# Patient Record
Sex: MALE | Employment: OTHER | URBAN - METROPOLITAN AREA
[De-identification: names, ages, dates, MRNs, and addresses within clinical notes are randomized per-mention and may not be internally consistent; named-entity substitution may affect disease eponyms.]

---

## 2017-11-14 PROBLEM — H26.9 RIGHT CATARACT: Status: ACTIVE | Noted: 2017-11-14

## 2023-01-27 ENCOUNTER — APPOINTMENT (OUTPATIENT)
Dept: CT IMAGING | Age: 88
End: 2023-01-27
Payer: MEDICARE

## 2023-01-27 ENCOUNTER — HOSPITAL ENCOUNTER (EMERGENCY)
Age: 88
Discharge: HOME | End: 2023-01-27
Payer: MEDICARE

## 2023-01-27 VITALS
RESPIRATION RATE: 18 BRPM | WEIGHT: 175 LBS | SYSTOLIC BLOOD PRESSURE: 154 MMHG | BODY MASS INDEX: 26.52 KG/M2 | DIASTOLIC BLOOD PRESSURE: 76 MMHG | OXYGEN SATURATION: 96 % | HEIGHT: 68 IN | HEART RATE: 90 BPM | TEMPERATURE: 99.2 F

## 2023-01-27 DIAGNOSIS — R11.2 NAUSEA AND VOMITING, UNSPECIFIED VOMITING TYPE: Primary | ICD-10-CM

## 2023-01-27 LAB
ALBUMIN SERPL-MCNC: 4.5 G/DL (ref 3.5–5.2)
ALP BLD-CCNC: 58 U/L (ref 40–129)
ALT SERPL-CCNC: 21 U/L (ref 0–40)
ANION GAP SERPL CALCULATED.3IONS-SCNC: 9 MMOL/L (ref 7–16)
AST SERPL-CCNC: 23 U/L (ref 0–39)
BACTERIA: ABNORMAL /HPF
BASOPHILS ABSOLUTE: 0.02 E9/L (ref 0–0.2)
BASOPHILS RELATIVE PERCENT: 0.2 % (ref 0–2)
BILIRUB SERPL-MCNC: 0.5 MG/DL (ref 0–1.2)
BILIRUBIN URINE: NEGATIVE
BLOOD, URINE: ABNORMAL
BUN BLDV-MCNC: 22 MG/DL (ref 6–23)
CALCIUM SERPL-MCNC: 10.1 MG/DL (ref 8.6–10.2)
CHLORIDE BLD-SCNC: 105 MMOL/L (ref 98–107)
CLARITY: CLEAR
CO2: 28 MMOL/L (ref 22–29)
COLOR: YELLOW
CREAT SERPL-MCNC: 0.9 MG/DL (ref 0.7–1.2)
EOSINOPHILS ABSOLUTE: 0.04 E9/L (ref 0.05–0.5)
EOSINOPHILS RELATIVE PERCENT: 0.4 % (ref 0–6)
EPITHELIAL CELLS, UA: ABNORMAL /HPF
GFR SERPL CREATININE-BSD FRML MDRD: >60 ML/MIN/1.73
GLUCOSE BLD-MCNC: 113 MG/DL (ref 74–99)
GLUCOSE URINE: NEGATIVE MG/DL
HCT VFR BLD CALC: 49.6 % (ref 37–54)
HEMOGLOBIN: 16.3 G/DL (ref 12.5–16.5)
IMMATURE GRANULOCYTES #: 0.03 E9/L
IMMATURE GRANULOCYTES %: 0.3 % (ref 0–5)
KETONES, URINE: NEGATIVE MG/DL
LACTIC ACID: 1 MMOL/L (ref 0.5–2.2)
LEUKOCYTE ESTERASE, URINE: NEGATIVE
LIPASE: 19 U/L (ref 13–60)
LYMPHOCYTES ABSOLUTE: 1.95 E9/L (ref 1.5–4)
LYMPHOCYTES RELATIVE PERCENT: 18 % (ref 20–42)
MCH RBC QN AUTO: 30.9 PG (ref 26–35)
MCHC RBC AUTO-ENTMCNC: 32.9 % (ref 32–34.5)
MCV RBC AUTO: 93.9 FL (ref 80–99.9)
MONOCYTES ABSOLUTE: 0.51 E9/L (ref 0.1–0.95)
MONOCYTES RELATIVE PERCENT: 4.7 % (ref 2–12)
NEUTROPHILS ABSOLUTE: 8.31 E9/L (ref 1.8–7.3)
NEUTROPHILS RELATIVE PERCENT: 76.4 % (ref 43–80)
NITRITE, URINE: NEGATIVE
PDW BLD-RTO: 13.2 FL (ref 11.5–15)
PH UA: 5 (ref 5–9)
PLATELET # BLD: 214 E9/L (ref 130–450)
PMV BLD AUTO: 9.9 FL (ref 7–12)
POTASSIUM SERPL-SCNC: 4.2 MMOL/L (ref 3.5–5)
PROTEIN UA: 100 MG/DL
RBC # BLD: 5.28 E12/L (ref 3.8–5.8)
RBC UA: ABNORMAL /HPF (ref 0–2)
SODIUM BLD-SCNC: 142 MMOL/L (ref 132–146)
SPECIFIC GRAVITY UA: >=1.03 (ref 1–1.03)
TOTAL PROTEIN: 7.1 G/DL (ref 6.4–8.3)
UROBILINOGEN, URINE: 0.2 E.U./DL
WBC # BLD: 10.9 E9/L (ref 4.5–11.5)
WBC UA: ABNORMAL /HPF (ref 0–5)

## 2023-01-27 PROCEDURE — 80053 COMPREHEN METABOLIC PANEL: CPT

## 2023-01-27 PROCEDURE — 99285 EMERGENCY DEPT VISIT HI MDM: CPT

## 2023-01-27 PROCEDURE — 85025 COMPLETE CBC W/AUTO DIFF WBC: CPT

## 2023-01-27 PROCEDURE — 6360000004 HC RX CONTRAST MEDICATION: Performed by: RADIOLOGY

## 2023-01-27 PROCEDURE — 81001 URINALYSIS AUTO W/SCOPE: CPT

## 2023-01-27 PROCEDURE — 74177 CT ABD & PELVIS W/CONTRAST: CPT

## 2023-01-27 PROCEDURE — 83605 ASSAY OF LACTIC ACID: CPT

## 2023-01-27 PROCEDURE — 2580000003 HC RX 258: Performed by: PHYSICIAN ASSISTANT

## 2023-01-27 PROCEDURE — 83690 ASSAY OF LIPASE: CPT

## 2023-01-27 PROCEDURE — 6360000002 HC RX W HCPCS: Performed by: PHYSICIAN ASSISTANT

## 2023-01-27 PROCEDURE — 96374 THER/PROPH/DIAG INJ IV PUSH: CPT

## 2023-01-27 RX ORDER — ONDANSETRON 4 MG/1
4 TABLET, FILM COATED ORAL EVERY 8 HOURS PRN
Qty: 20 TABLET | Refills: 0 | Status: SHIPPED | OUTPATIENT
Start: 2023-01-27

## 2023-01-27 RX ORDER — 0.9 % SODIUM CHLORIDE 0.9 %
1000 INTRAVENOUS SOLUTION INTRAVENOUS ONCE
Status: COMPLETED | OUTPATIENT
Start: 2023-01-27 | End: 2023-01-27

## 2023-01-27 RX ORDER — ONDANSETRON 2 MG/ML
4 INJECTION INTRAMUSCULAR; INTRAVENOUS ONCE
Status: COMPLETED | OUTPATIENT
Start: 2023-01-27 | End: 2023-01-27

## 2023-01-27 RX ADMIN — IOPAMIDOL 75 ML: 755 INJECTION, SOLUTION INTRAVENOUS at 20:19

## 2023-01-27 RX ADMIN — ONDANSETRON 4 MG: 2 INJECTION INTRAMUSCULAR; INTRAVENOUS at 19:37

## 2023-01-27 RX ADMIN — SODIUM CHLORIDE 1000 ML: 9 INJECTION, SOLUTION INTRAVENOUS at 19:38

## 2023-01-27 ASSESSMENT — LIFESTYLE VARIABLES
HOW OFTEN DO YOU HAVE A DRINK CONTAINING ALCOHOL: NEVER
HOW MANY STANDARD DRINKS CONTAINING ALCOHOL DO YOU HAVE ON A TYPICAL DAY: PATIENT DOES NOT DRINK

## 2023-01-27 NOTE — Clinical Note
Rosalina Landa was seen and treated in our emergency department on 1/27/2023. He may return to work on 01/28/2023. If you have any questions or concerns, please don't hesitate to call.       Jarrett Crooks PA-C

## 2023-01-28 NOTE — ED PROVIDER NOTES
[facility] Visit. Department of Emergency Medicine   ED  Encounter Note  Admit Date/RoomTime: 2023  6:55 PM  ED Room:     NAME: Tesha Contreras  : 1935  MRN: [id number]     Chief Complaint:  Emesis (Pt used baking soda deordorizer accin cookie recipe)    History of Present Illness        Tesha Contreras is a 80 y.o. old male who presents to the emergency department by private vehicle, with gradual onset of nausea and vomiting of dry heaves, undigested food, or clear fluids which began today prior to arrival.  There has been no similar episodes in the past.  Patient is concerned that the symptoms may be due to using a baking soda deodorizer in a cookie recipe that he recently made. Patient states that he made approximately 30 cookies 2 days ago. Has eaten all 30 cookies in the last 2 days. . The symptoms are associated with no additional symptoms. Since inset the symptoms have been remaining constant. His symptoms are aggravated by eating and liquids and relieved by nothing. There has been no abdominal pain, back pain, chest pain, shortness of breath, fever, chills, sweating, anorexia, weight loss, diarrhea, constipation, dark/black stools, blood in stool, blood in emesis, cloudy urine, urinary frequency, dysuria, hematuria, urinary urgency, urinary incontinence, penile discharge, or scrotal pain. ROS   Pertinent positives and negatives are stated within HPI, all other systems reviewed and are negative. Past Medical History:  has a past medical history of Acid reflux, Cancer ([address] Hypertension, and Prostate enlargement. Surgical History:  has a past surgical history that includes skin biopsy; Appendectomy; Tonsillectomy; Abdomen surgery; joint replacement; Knee arthroscopy; Cataract removal with implant (Left, 04/07/15); and Cataract removal with implant (Right, 2017). Social History:  reports that he has never smoked.  He has never used smokeless tobacco. He reports current alcohol use. Family History: family history is not on file. Allergies: Sulfa antibiotics    Physical Exam   Oxygen Saturation Interpretation: Normal on room air analysis. ED Triage Vitals   BP Temp Temp Source Heart Rate Resp SpO2 Height Weight   01/27/23 1856 01/27/23 1846 01/27/23 1846 01/27/23 1856 01/27/23 1856 01/27/23 1856 01/27/23 1856 01/27/23 1856   (!) 180/96 99.2 °F (37.3 °C) Oral 90 18 96 % 5' 8\" (1.727 m) 175 lb (79.4 kg)       Constitutional:  Alert, development consistent with age. HEENT:  NC/NT. Airway patent. Neck:  Normal ROM. Supple. Respiratory:  Clear to auscultation and breath sounds equal.  CV:  Regular rate and rhythm, normal heart sounds, without pathological murmurs, ectopy, gallops, or rubs. GI:  normal appearing, non-distended with no visible hernias. Bowel sounds: normal bowel sounds. Tenderness: No abdominal tenderness, guarding, rebound, rigidity or pulsatile mass. .        Liver: non-tender. Spleen:  non-tender. /Rectal: Rectal Examination deferred, N/A or declined by patient  Back: CVA Tenderness: No CVA tenderness. Integument:  Normal turgor. Warm, dry, without visible rash, unless noted elsewhere. Lymphatics: No lymphangitis or adenopathy noted. Neurological:  Oriented. Motor functions intact.     Lab / Imaging Results   (All laboratory and radiology results have been personally reviewed by myself)  Labs:  Results for orders placed or performed during the hospital encounter of 01/27/23   Comprehensive Metabolic Panel   Result Value Ref Range    Sodium 142 132 - 146 mmol/L    Potassium 4.2 3.5 - 5.0 mmol/L    Chloride 105 98 - 107 mmol/L    CO2 28 22 - 29 mmol/L    Anion Gap 9 7 - 16 mmol/L    Glucose 113 (H) 74 - 99 mg/dL    BUN 22 6 - 23 mg/dL    Creatinine 0.9 0.7 - 1.2 mg/dL    Est, Glom Filt Rate >60 >=60 mL/min/1.73    Calcium 10.1 8.6 - 10.2 mg/dL    Total Protein 7.1 6.4 - 8.3 g/dL    Albumin 4.5 3.5 - 5.2 g/dL    Total Bilirubin 0.5 0.0 - 1.2 mg/dL    Alkaline Phosphatase 58 40 - 129 U/L    ALT 21 0 - 40 U/L    AST 23 0 - 39 U/L   CBC with Auto Differential   Result Value Ref Range    WBC 10.9 4.5 - 11.5 E9/L    RBC 5.28 3.80 - 5.80 E12/L    Hemoglobin 16.3 12.5 - 16.5 g/dL    Hematocrit 49.6 37.0 - 54.0 %    MCV 93.9 80.0 - 99.9 fL    MCH 30.9 26.0 - 35.0 pg    MCHC 32.9 32.0 - 34.5 %    RDW 13.2 11.5 - 15.0 fL    Platelets 034 323 - 514 E9/L    MPV 9.9 7.0 - 12.0 fL    Neutrophils % 76.4 43.0 - 80.0 %    Immature Granulocytes % 0.3 0.0 - 5.0 %    Lymphocytes % 18.0 (L) 20.0 - 42.0 %    Monocytes % 4.7 2.0 - 12.0 %    Eosinophils % 0.4 0.0 - 6.0 %    Basophils % 0.2 0.0 - 2.0 %    Neutrophils Absolute 8.31 (H) 1.80 - 7.30 E9/L    Immature Granulocytes # 0.03 E9/L    Lymphocytes Absolute 1.95 1.50 - 4.00 E9/L    Monocytes Absolute 0.51 0.10 - 0.95 E9/L    Eosinophils Absolute 0.04 (L) 0.05 - 0.50 E9/L    Basophils Absolute 0.02 0.00 - 0.20 E9/L   Lipase   Result Value Ref Range    Lipase 19 13 - 60 U/L   Urinalysis with Microscopic   Result Value Ref Range    Color, UA Yellow Straw/Yellow    Clarity, UA Clear Clear    Glucose, Ur Negative Negative mg/dL    Bilirubin Urine Negative Negative    Ketones, Urine Negative Negative mg/dL    Specific Gravity, UA >=1.030 1.005 - 1.030    Blood, Urine SMALL (A) Negative    pH, UA 5.0 5.0 - 9.0    Protein,  (A) Negative mg/dL    Urobilinogen, Urine 0.2 <2.0 E.U./dL    Nitrite, Urine Negative Negative    Leukocyte Esterase, Urine Negative Negative    WBC, UA NONE 0 - 5 /HPF    RBC, UA 1-3 0 - 2 /HPF    Epithelial Cells, UA FEW /HPF    Bacteria, UA NONE SEEN None Seen /HPF   Lactic Acid   Result Value Ref Range    Lactic Acid 1.0 0.5 - 2.2 mmol/L     Imaging: All Radiology results interpreted by Radiologist unless otherwise noted.   CT ABDOMEN PELVIS W IV CONTRAST Additional Contrast? None   Final Result   Gallbladder distension with no gallbladder wall thickening, stones, sludge or pericholecystic fluid. Grossly normal common bile duct. Correlate with   right upper quadrant pain specifically. Diffuse colonic fecal retention. ED Course / Medical Decision Making     Medications   0.9 % sodium chloride bolus (1,000 mLs IntraVENous New Bag 1/27/23 1938)   ondansetron (ZOFRAN) injection 4 mg (4 mg IntraVENous Given 1/27/23 1937)   iopamidol (ISOVUE-370) 76 % injection 75 mL (75 mLs IntraVENous Given 1/27/23 2019)      Re-examination:  1/27/23       Time: 2100  Patients condition is improving. Patient moved from the internal waiting room to bed #26. He gave the patient a cup of water and he finished the entire thing while I was discussing his results with him. Repeat examination shows no abdominal tenderness whatsoever. Specifically he has no right upper quadrant tenderness. Negative Sylvester sign. Consultations:             None    Procedure(s):   None    MDM:   Patient presents to the emergency department for nausea and vomiting. Concern for using a baking soda deodorizer in her recent cookie recipe, however after reviewing the box it only contains sodium bicarbonate which would be the same active ingredient in regular baking soda. Did discuss this with my attending physician and the clinical pharmacist.  Patient more likely experiencing nausea and vomiting from eating 30 cookies. Based on the patient's symptoms and his age, a thorough diagnostic work-up to include labs and CT abdomen pelvis was obtained. Labs are obtained and are normal.  CT abdomen pelvis did show gallbladder distention without gallbladder wall thickening, stones, sludge, or pericholecystic fluid. See reexamination above, he has no abdominal tenderness and a negative Sylvester sign. In a decision-making process ultrasound right upper quadrant was not obtained. He will receive the prescription below which and follow-up with his primary care provider. Patient educated on new meds.   Strict return precautions were discussed and he should return for any new or worsening symptoms. He understands that if he develops any pain whatsoever he should come back to the emergency room immediately even if he has to call 911 to do so. Plan of Care/Counseling:  Mike Bolaños PA-C reviewed today's visit with the patient in addition to providing specific details for the plan of care and counseling regarding the diagnosis and prognosis. Questions are answered at this time and are agreeable with the plan. Assessment     1. Nausea and vomiting, unspecified vomiting type      Plan   Discharged home. Patient condition is good    New Medications     New Prescriptions    ONDANSETRON (ZOFRAN) 4 MG TABLET    Take 1 tablet by mouth every 8 hours as needed for Nausea or Vomiting     Electronically signed by Mike Bolaños PA-C   DD: 1/27/23  **This report was transcribed using voice recognition software. Every effort was made to ensure accuracy; however, inadvertent computerized transcription errors may be present.   END OF ED PROVIDER NOTE        Mike Bolaños PA-C  01/27/23 8685

## 2023-12-15 ENCOUNTER — HOSPITAL ENCOUNTER (EMERGENCY)
Age: 88
Discharge: HOME | End: 2023-12-15
Payer: MEDICARE

## 2023-12-15 ENCOUNTER — APPOINTMENT (OUTPATIENT)
Dept: GENERAL RADIOLOGY | Age: 88
End: 2023-12-15
Payer: MEDICARE

## 2023-12-15 ENCOUNTER — APPOINTMENT (OUTPATIENT)
Dept: CT IMAGING | Age: 88
End: 2023-12-15
Payer: MEDICARE

## 2023-12-15 VITALS
DIASTOLIC BLOOD PRESSURE: 84 MMHG | TEMPERATURE: 97.8 F | OXYGEN SATURATION: 98 % | SYSTOLIC BLOOD PRESSURE: 173 MMHG | RESPIRATION RATE: 16 BRPM | HEART RATE: 80 BPM

## 2023-12-15 DIAGNOSIS — S62.613A CLOSED DISPLACED FRACTURE OF PROXIMAL PHALANX OF LEFT MIDDLE FINGER, INITIAL ENCOUNTER: Primary | ICD-10-CM

## 2023-12-15 DIAGNOSIS — S61.412A LACERATION OF LEFT HAND WITHOUT FOREIGN BODY, INITIAL ENCOUNTER: ICD-10-CM
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
## 2023-12-15 DIAGNOSIS — S62.615A CLOSED DISPLACED FRACTURE OF PROXIMAL PHALANX OF LEFT RING FINGER, INITIAL ENCOUNTER: ICD-10-CM

## 2023-12-15 PROCEDURE — 90471 IMMUNIZATION ADMIN: CPT | Performed by: PHYSICIAN ASSISTANT

## 2023-12-15 PROCEDURE — 6360000002 HC RX W HCPCS: Performed by: PHYSICIAN ASSISTANT

## 2023-12-15 PROCEDURE — 90714 TD VACC NO PRESV 7 YRS+ IM: CPT | Performed by: PHYSICIAN ASSISTANT

## 2023-12-15 PROCEDURE — 72125 CT NECK SPINE W/O DYE: CPT

## 2023-12-15 PROCEDURE — 73130 X-RAY EXAM OF HAND: CPT

## 2023-12-15 PROCEDURE — 70450 CT HEAD/BRAIN W/O DYE: CPT

## 2023-12-15 RX ORDER — LIDOCAINE HYDROCHLORIDE 10 MG/ML
5 INJECTION, SOLUTION INFILTRATION; PERINEURAL ONCE
Status: DISCONTINUED | OUTPATIENT
Start: 2023-12-15 | End: 2023-12-15 | Stop reason: HOSPADM

## 2023-12-15 RX ORDER — TETANUS AND DIPHTHERIA TOXOIDS ADSORBED 2; 2 [LF]/.5ML; [LF]/.5ML
0.5 INJECTION INTRAMUSCULAR ONCE
Status: COMPLETED | OUTPATIENT
Start: 2023-12-15 | End: 2023-12-15

## 2023-12-15 RX ADMIN — TETANUS AND DIPHTHERIA TOXOIDS ADSORBED 0.5 ML: 2; 2 INJECTION INTRAMUSCULAR at 18:56

## 2023-12-29 ENCOUNTER — HOSPITAL ENCOUNTER (EMERGENCY)
Age: 88
Discharge: HOME | End: 2023-12-29
Payer: MEDICARE

## 2023-12-29 VITALS
TEMPERATURE: 98.9 F | OXYGEN SATURATION: 98 % | RESPIRATION RATE: 20 BRPM | DIASTOLIC BLOOD PRESSURE: 86 MMHG | WEIGHT: 178 LBS | BODY MASS INDEX: 27.06 KG/M2 | HEART RATE: 79 BPM | SYSTOLIC BLOOD PRESSURE: 173 MMHG

## 2023-12-29 DIAGNOSIS — Z48.02 ENCOUNTER FOR REMOVAL OF SUTURES: Primary | ICD-10-CM

## 2023-12-29 PROCEDURE — 99212 OFFICE O/P EST SF 10 MIN: CPT

## 2023-12-29 ASSESSMENT — PAIN - FUNCTIONAL ASSESSMENT: PAIN_FUNCTIONAL_ASSESSMENT: 0-10

## 2023-12-29 ASSESSMENT — PAIN SCALES - GENERAL: PAINLEVEL_OUTOF10: 0

## 2023-12-29 NOTE — ED PROVIDER NOTES
[facility]  Department of Emergency Medicine    encounter Note  Admit Date/RoomTime: 2023 12:08 PM   room:     NAME: Sánchez Gallegos  : 1935  MRN: [id number]     Chief Complaint:  Suture / Staple Removal    History of Present Illness       Sánchez Gallegos is a 88 y.o. old male who presents to the urgent care for suture removal from his forehead and his hand.  Patient was seen at [facility] and had sutures placed in his forehead and left hand on the palmar surface following a fall on 12/15/2023.  He has not had any complications with the sutures, no fevers or drainage, states that he is not having pain to the area.             Associated Symptoms:      [x]   None        []   Pain      []   Swelling      []   Redness      []   Drainage      []   Bleeding      []   Fever/Chills     ROS   Pertinent positives and negatives are stated within HPI, all other systems reviewed and are negative.    Past Medical History:  has a past medical history of Acid reflux, Cancer (HCC), Hypertension, and Prostate enlargement.    Surgical History:  has a past surgical history that includes skin biopsy; Appendectomy; Tonsillectomy; Abdomen surgery; joint replacement; Knee arthroscopy; Cataract removal with implant (Left, 04/07/15); and Cataract removal with implant (Right, 2017).    Social History:  reports that he has never smoked. He has never used smokeless tobacco. He reports current alcohol use.    Family History: family history is not on file.     Allergies: Sulfa antibiotics    Physical Exam   Oxygen Saturation Interpretation: Normal.        ED Triage Vitals   BP Temp Temp src Pulse Respirations SpO2 Height Weight - Scale   23 1211 23 1211 -- 23 1211 23 1211 23 1211 -- 23 1209   (!) 173/86 98.9 °F (37.2 °C)  79 20 98 %  80.7 kg (178 lb)         Constitutional:  Alert, development consistent with age.  HEENT:  NC/NT.  Airway patent.  Neck:  Normal ROM.   Care/Counseling:  ERIKA Maynard CNP reviewed today's visit with the patient in addition to providing specific details for the plan of care and counseling regarding the diagnosis and prognosis. Questions are answered at this time and are agreeable with the plan. Assessment     1. Encounter for removal of sutures      Plan   Discharged home. Patient condition is stable    [facility]  [address] [place] [address] [phone]    Call   As needed       New Medications     New Prescriptions    No medications on file     Electronically signed by ERIKA Maynard CNP   DD: 12/29/23  **This report was transcribed using voice recognition software. Every effort was made to ensure accuracy; however, inadvertent computerized transcription errors may be present.   END OF ED PROVIDER NOTE      ERIKA Maynard CNP  12/29/23 0059

## 2023-12-29 NOTE — DISCHARGE INSTR - COC
ACCESS:922709156}    Nursing Mobility/ADLs:  Walking   {CHP DME ADLs:228911865}  Transfer  {CHP DME ADLs:327115699}  Bathing  {CHP DME ADLs:546072517}  Dressing  {CHP DME ADLs:089913639}  Toileting  {CHP DME ADLs:947826888}  Feeding  {CHP DME ADLs:923375682}  Med Admin  {CHP DME ADLs:309719504}  Med Delivery   { JIAN MED Delivery:124668467}    Wound Care Documentation and Therapy:        Elimination:  Continence:   Bowel: {YES / NO:}  Bladder: {YES / NO:}  Urinary Catheter: {Urinary Catheter:484910067}   Colostomy/Ileostomy/Ileal Conduit: {YES / NO:}       Date of Last BM: ***  No intake or output data in the 24 hours ending 23 1248  No intake/output data recorded.    Safety Concerns:     { JIAN Safety Concerns:309068580}    Impairments/Disabilities:      { JIAN Impairments/Disabilities:803705955}    Nutrition Therapy:  Current Nutrition Therapy:   { JIAN Diet List:088052563}    Routes of Feeding: {P DME Other Feedings:959152961}  Liquids: {Slp liquid thickness:20234}  Daily Fluid Restriction: {CHP DME Yes amt example:574131540}  Last Modified Barium Swallow with Video (Video Swallowing Test): {Done Not Done Date:}    Treatments at the Time of Hospital Discharge:   Respiratory Treatments: ***  Oxygen Therapy:  {Therapy; copd oxygen:42453}  Ventilator:    { CC Vent List:830158286}    Rehab Therapies: {THERAPEUTIC INTERVENTION:6093956407}  Weight Bearing Status/Restrictions: {[facility] Weight Bearin}  Other Medical Equipment (for information only, NOT a DME order):  {EQUIPMENT:890872367}  Other Treatments: ***    Patient's personal belongings (please select all that are sent with patient):  {P DME Belongings:035935677}    RN SIGNATURE:  {Esignature:142922091}    CASE MANAGEMENT/SOCIAL WORK SECTION    Inpatient Status Date: ***    Readmission Risk Assessment Score:  Readmission Risk              Risk of Unplanned Readmission:  0           Discharging to Facility/ Agency  Name:   Address:  Phone:  Fax:    Dialysis Facility (if applicable)   Name:  Address:  Dialysis Schedule:  Phone:  Fax:    / signature: {Esignature:354668023}    PHYSICIAN SECTION    Prognosis: {Prognosis:6608413161}    Condition at Discharge: [address] Patient Condition:206776536}    Rehab Potential (if transferring to Rehab): {Prognosis:3660282372}    Recommended Labs or Other Treatments After Discharge: ***    Physician Certification: I certify the above information and transfer of Ora Aparicio  is necessary for the continuing treatment of the diagnosis listed and that he requires {Admit to Appropriate Level of Care:96181} for {GREATER/LESS:086591063} 30 days.      Update Admission H&P: {CHP DME Changes in BLSOC:000785949}    PHYSICIAN SIGNATURE:  {Esignature:126047835}

## 2024-01-03 ENCOUNTER — EVALUATION (OUTPATIENT)
Dept: OCCUPATIONAL THERAPY | Age: 89
End: 2024-01-03

## 2024-01-03 DIAGNOSIS — S62.618A: ICD-10-CM

## 2024-01-03 DIAGNOSIS — S62.618A: Primary | ICD-10-CM

## 2024-01-03 NOTE — PROGRESS NOTES
Iontophoresis:   [x] Tendon Glides                                               [] Neuromuscular Re-Ed            [] ADL/IADL re-training    [x] Therapeutic Activity       [x] Pain Management with/without modalities PRN                 [x] Manual Therapy                      [] Splinting                      [x] Scar Management                   []Joint Protection/Training  []Ergonomics                             [] Joint Mobilization        [] Adaptive Equipment Assessment/Training                             [x] Manual Edema Mobilization   [] Myofascial Release                 [] Energy Conservation/Work Simplification  [x] FM Coordination       [] Safety retraining/education per  individual diagnosis/goals  [] Desensitization       Patient Specific Goal: Use hand again                             GOALS (Long term same as Short term):  1) Patient will demonstrate good understanding of home program (exercises/activities/diagnosis/prognosis/goals) with good accuracy.   2) Patient will demonstrate increased active/passive range of motion of their left hand by atleast DPC <1 for ADL/IADL completion including all self care and using the hand for driving.  3) Patient will demonstrate  /pinch strength of at least 30 / 5 pinch pounds of their left hand for ease of opening tight containers and unilateral lifting of items during the day.   4) Patient to report decreased pain in their affected left hand from 1-6/10 light hand movement to 2/10 or less with full daily functional use.   5) Increase in fine motor function as evidenced by decreased time to complete 9-hole peg test and/or MRMT test by at least 5-10 seconds with  left hand  in order to complete fasteners more easily.   6) Patient to demonstrate decreased guarding of the left hand fractured fingers  from 100% to 20% or less.   7) Patient will decrease QuickDASH score to 25% or less for increased participation in daily functional activities.     Past

## 2024-01-05 PROBLEM — S62.618A: Status: ACTIVE | Noted: 2024-01-05

## 2024-01-10 ENCOUNTER — TREATMENT (OUTPATIENT)
Dept: OCCUPATIONAL THERAPY | Age: 89
End: 2024-01-10

## 2024-01-10 DIAGNOSIS — S62.618A: ICD-10-CM

## 2024-01-10 DIAGNOSIS — S62.618A: Primary | ICD-10-CM

## 2024-01-10 NOTE — PROGRESS NOTES
OCCUPATIONAL THERAPY DAILY NOTE  [facility] PHYSICIANS [place] SPECIALTY [facility] OCCUPATIONAL THERAPY   [address] [place] [address]  Dept: [phone]  Loc: [phone]   [facility] OT Fax: [phone]      Date:  1/10/2024  Initial Evaluation Date: 1-3-24     Evaluating Therapist: Ashley Estevez OT    Patient Name:  Sánchez Gallegos    :  1935    Restrictions/Precautions:  Gentle ROM only, NWB x 4 weeks,  Low fall risk  Diagnosis:    S62.618A Displaced fracture of the proximal phalanx of other finger , initial encounter for closed fracture                                                             Date of Surgery/Injury: LMF/ LRF proximal phalanx fractures (DOI 12/15/23)     Insurance/Certification information:  [employer] Medicare  Plan of care signed (Y/N): Y (thru 4-3-24)  Visit# / total visits: Eval+  visits ( 10 approved visits thru 4-3-24)     Referring Practitioner:  Dr Donald House  Specific Practitioner Orders: OT evaluate and treat: PROM, AAROM, AROM, strengthening,  and modalities as needed.  Pt with fractures of the proximal phalanx of the LM/ LR fingers. Pt is OK for buddy taping for gentle AROM and stretching. Continue NWB for the first 4 weeks post injury.     Assessment of current deficits   [] Functional mobility             [x] ADLs           [x] Strength                  [] Cognition   [] Functional transfers           [x] IADLs          [] Safety Awareness  [] Endurance   [x] Fine Motor Coordination    [] Balance      [] Vision/perception    [x] Sensation     [] Gross Motor Coordination [x] ROM           [x] Pain                        [x] Edema          [x] Scar Adhesion/Skin Integrity      OT PLAN OF CARE   OT POC based on physician orders, patient diagnosis and results of clinical assessment     Frequency/Duration: 2x/ week x 8 weeks     Specific OT Treatment to include:   [x] Instruction in HEP                   Modalities:  [x] Therapeutic Exercise

## 2024-01-12 ENCOUNTER — TREATMENT (OUTPATIENT)
Dept: OCCUPATIONAL THERAPY | Age: 89
End: 2024-01-12

## 2024-01-12 DIAGNOSIS — S62.618A: Primary | ICD-10-CM

## 2024-01-12 DIAGNOSIS — S62.618A: ICD-10-CM

## 2024-01-12 NOTE — PROGRESS NOTES
[x] Ultrasound               [] Electrical Stimulation/Attended  [x] PROM/Stretching                    [x] Fluidotherapy          [x]  Paraffin                   [x] AAROM  [x] AROM                 [] Iontophoresis:   [x] Tendon Glides                                               [] Neuromuscular Re-Ed            [] ADL/IADL re-training    [x] Therapeutic Activity                  [x] Pain Management with/without modalities PRN                 [x] Manual Therapy                      [] Splinting                                   [x] Scar Management                   []Joint Protection/Training  []Ergonomics                             [] Joint Mobilization                      [] Adaptive Equipment Assessment/Training                             [x] Manual Edema Mobilization   [] Myofascial Release                 [] Energy Conservation/Work Simplification  [x] FM Coordination           [] Safety retraining/education per  individual diagnosis/goals  [] Desensitization        Patient Specific Goal: Use hand again                             GOALS (Long term same as Short term):  1) Patient will demonstrate good understanding of home program (exercises/activities/diagnosis/prognosis/goals) with good accuracy.   2) Patient will demonstrate increased active/passive range of motion of their left hand by atleast DPC <1 for ADL/IADL completion including all self care and using the hand for driving.  3) Patient will demonstrate  /pinch strength of at least 30 / 5 pinch pounds of their left hand for ease of opening tight containers and unilateral lifting of items during the day.   4) Patient to report decreased pain in their affected left hand from 1-6/10 light hand movement to 2/10 or less with full daily functional use.   5) Increase in fine motor function as evidenced by decreased time to complete 9-hole peg test and/or MRMT test by at least 5-10 seconds with  left hand  in order to complete fasteners more

## 2024-01-15 ENCOUNTER — TREATMENT (OUTPATIENT)
Dept: OCCUPATIONAL THERAPY | Age: 89
End: 2024-01-15
Payer: MEDICARE

## 2024-01-15 DIAGNOSIS — S62.618A: ICD-10-CM

## 2024-01-15 DIAGNOSIS — S62.618A: Primary | ICD-10-CM

## 2024-01-15 PROCEDURE — 97530 THERAPEUTIC ACTIVITIES: CPT | Performed by: OCCUPATIONAL THERAPY ASSISTANT

## 2024-01-15 PROCEDURE — 97110 THERAPEUTIC EXERCISES: CPT | Performed by: OCCUPATIONAL THERAPY ASSISTANT

## 2024-01-15 PROCEDURE — 97140 MANUAL THERAPY 1/> REGIONS: CPT | Performed by: OCCUPATIONAL THERAPY ASSISTANT

## 2024-01-15 PROCEDURE — 97018 PARAFFIN BATH THERAPY: CPT | Performed by: OCCUPATIONAL THERAPY ASSISTANT

## 2024-01-15 NOTE — PROGRESS NOTES
OCCUPATIONAL THERAPY DAILY NOTE  [facility] PHYSICIANS [place] SPECIALTY [facility] OCCUPATIONAL THERAPY   [address] [place] [address]  Dept: [phone]  Loc: [phone]   [facility] OT Fax: [phone]      Date:  1/15/2024  Initial Evaluation Date: 1-3-24     Evaluating Therapist: FRANCISCA Otero    Patient Name:  Sánchez Gallegos    :  1935    Restrictions/Precautions:  Gentle ROM only, NWB x 4 weeks,  Low fall risk  Diagnosis:    S62.618A Displaced fracture of the proximal phalanx of other finger , initial encounter for closed fracture                                                             Date of Surgery/Injury: LMF/ LRF proximal phalanx fractures (DOI 12/15/23)     Insurance/Certification information:  [employer] Medicare  Plan of care signed (Y/N): Y (thru 4-3-24)  Visit# / total visits: Eval+ 3 /  visits ( 10 approved visits thru 4-3-24)     Referring Practitioner:  Dr Donald House  Specific Practitioner Orders: OT evaluate and treat: PROM, AAROM, AROM, strengthening,  and modalities as needed.  Pt with fractures of the proximal phalanx of the LM/ LR fingers. Pt is OK for buddy taping for gentle AROM and stretching. Continue NWB for the first 4 weeks post injury.     Assessment of current deficits   [] Functional mobility             [x] ADLs           [x] Strength                  [] Cognition   [] Functional transfers           [x] IADLs          [] Safety Awareness  [] Endurance   [x] Fine Motor Coordination    [] Balance      [] Vision/perception    [x] Sensation     [] Gross Motor Coordination [x] ROM           [x] Pain                        [x] Edema          [x] Scar Adhesion/Skin Integrity      OT PLAN OF CARE   OT POC based on physician orders, patient diagnosis and results of clinical assessment     Frequency/Duration: 2x/ week x 8 weeks     Specific OT Treatment to include:   [x] Instruction in HEP                   Modalities:  [x] Therapeutic Exercise

## 2024-01-17 ENCOUNTER — TREATMENT (OUTPATIENT)
Dept: OCCUPATIONAL THERAPY | Age: 89
End: 2024-01-17

## 2024-01-17 DIAGNOSIS — S62.618A: ICD-10-CM

## 2024-01-17 DIAGNOSIS — S62.618A: Primary | ICD-10-CM

## 2024-01-17 NOTE — PROGRESS NOTES
OCCUPATIONAL THERAPY DAILY NOTE  [facility] PHYSICIANS [place] SPECIALTY [facility] OCCUPATIONAL THERAPY   [address] [place] [address]  Dept: [phone]  Loc: [phone]   [facility] OT Fax: [phone]      Date:  2024  Initial Evaluation Date: 1-3-24     Evaluating Therapist: Ashley Estevez OT    Patient Name:  Sánchez Gallegos    :  1935    Restrictions/Precautions:  Gentle ROM only, NWB x 4 weeks,  Low fall risk  Diagnosis:    S62.618A Displaced fracture of the proximal phalanx of other finger , initial encounter for closed fracture                                                             Date of Surgery/Injury: LMF/ LRF proximal phalanx fractures (DOI 12/15/23)     Insurance/Certification information:  [employer]  Plan of care signed (Y/N): Y (thru 4-3-24)  Visit# / total visits: Eval+  /  visits ( 10 approved visits thru 4-3-24)     Referring Practitioner:  Dr Donald House  Specific Practitioner Orders: OT evaluate and treat: PROM, AAROM, AROM, strengthening,  and modalities as needed.  Pt with fractures of the proximal phalanx of the LM/ LR fingers. Pt is OK for buddy taping for gentle AROM and stretching. Continue NWB for the first 4 weeks post injury.     Assessment of current deficits   [] Functional mobility             [x] ADLs           [x] Strength                  [] Cognition   [] Functional transfers           [x] IADLs          [] Safety Awareness  [] Endurance   [x] Fine Motor Coordination    [] Balance      [] Vision/perception    [x] Sensation     [] Gross Motor Coordination [x] ROM           [x] Pain                        [x] Edema          [x] Scar Adhesion/Skin Integrity      OT PLAN OF CARE   OT POC based on physician orders, patient diagnosis and results of clinical assessment     Frequency/Duration: 2x/ week x 8 weeks     Specific OT Treatment to include:   [x] Instruction in HEP                   Modalities:  [x] Therapeutic Exercise

## 2024-01-22 ENCOUNTER — TREATMENT (OUTPATIENT)
Dept: OCCUPATIONAL THERAPY | Age: 89
End: 2024-01-22

## 2024-01-22 DIAGNOSIS — S62.618A: ICD-10-CM

## 2024-01-22 DIAGNOSIS — S62.618A: Primary | ICD-10-CM

## 2024-01-22 NOTE — PROGRESS NOTES
OCCUPATIONAL THERAPY DAILY NOTE  [facility] PHYSICIANS [place] SPECIALTY [facility] OCCUPATIONAL THERAPY   [address] [place] [address]  Dept: [phone]  Loc: [phone]   [facility] OT Fax: [phone]      Date:  2024  Initial Evaluation Date: 1-3-24     Evaluating Therapist: FRANCISCA Otero    Patient Name:  Sánchez Gallegos    :  1935    Restrictions/Precautions:  Gentle ROM only, NWB x 4 weeks,  Low fall risk  Diagnosis:    S62.618A Displaced fracture of the proximal phalanx of other finger , initial encounter for closed fracture                                                             Date of Surgery/Injury: LMF/ LRF proximal phalanx fractures (DOI 12/15/23)     Insurance/Certification information:  [employer] Medicare  Plan of care signed (Y/N): Y (thru 4-3-24)  Visit# / total visits: Eval+5 / 16 visits ( 10 approved visits thru 4-3-24)     Referring Practitioner:  Dr Donald House  Specific Practitioner Orders: OT evaluate and treat: PROM, AAROM, AROM, strengthening,  and modalities as needed.  Pt with fractures of the proximal phalanx of the LM/ LR fingers. Pt is OK for buddy taping for gentle AROM and stretching. Continue NWB for the first 4 weeks post injury.     Assessment of current deficits   [] Functional mobility             [x] ADLs           [x] Strength                  [] Cognition   [] Functional transfers           [x] IADLs          [] Safety Awareness  [] Endurance   [x] Fine Motor Coordination    [] Balance      [] Vision/perception    [x] Sensation     [] Gross Motor Coordination [x] ROM           [x] Pain                        [x] Edema          [x] Scar Adhesion/Skin Integrity      OT PLAN OF CARE   OT POC based on physician orders, patient diagnosis and results of clinical assessment     Frequency/Duration: 2x/ week x 8 weeks     Specific OT Treatment to include:   [x] Instruction in HEP                   Modalities:  [x] Therapeutic Exercise

## 2024-01-24 ENCOUNTER — TREATMENT (OUTPATIENT)
Dept: OCCUPATIONAL THERAPY | Age: 89
End: 2024-01-24
Payer: MEDICARE

## 2024-01-24 DIAGNOSIS — S62.618A: ICD-10-CM

## 2024-01-24 DIAGNOSIS — S62.618A: Primary | ICD-10-CM

## 2024-01-24 PROCEDURE — 97140 MANUAL THERAPY 1/> REGIONS: CPT | Performed by: OCCUPATIONAL THERAPY ASSISTANT

## 2024-01-24 PROCEDURE — 97530 THERAPEUTIC ACTIVITIES: CPT | Performed by: OCCUPATIONAL THERAPY ASSISTANT

## 2024-01-24 PROCEDURE — 97018 PARAFFIN BATH THERAPY: CPT | Performed by: OCCUPATIONAL THERAPY ASSISTANT

## 2024-01-24 PROCEDURE — 97110 THERAPEUTIC EXERCISES: CPT | Performed by: OCCUPATIONAL THERAPY ASSISTANT

## 2024-01-24 NOTE — PROGRESS NOTES
OCCUPATIONAL THERAPY DAILY NOTE  [facility] PHYSICIANS [place] SPECIALTY [facility] OCCUPATIONAL THERAPY   [address] [place] [address]  Dept: [phone]  Loc: [phone]   [facility] OT Fax: [phone]      Date:  2024  Initial Evaluation Date: 1-3-24     Evaluating Therapist: FRANCISCA Otero    Patient Name:  Sánchez Gallegos    :  1935    Restrictions/Precautions:  Gentle ROM only, NWB x 4 weeks,  Low fall risk  Diagnosis:    S62.618A Displaced fracture of the proximal phalanx of other finger , initial encounter for closed fracture                                                             Date of Surgery/Injury: LMF/ LRF proximal phalanx fractures (DOI 12/15/23)     Insurance/Certification information:  Human Medicare  Plan of care signed (Y/N): Y (thru 4-3-24)  Visit# / total visits: Eval+6 / 16 visits ( 10 approved visits thru 4-3-24)     Referring Practitioner:  Dr Donald House  Specific Practitioner Orders: OT evaluate and treat: PROM, AAROM, AROM, strengthening,  and modalities as needed.  Pt with fractures of the proximal phalanx of the LM/ LR fingers. Pt is OK for buddy taping for gentle AROM and stretching. Continue NWB for the first 4 weeks post injury.     Assessment of current deficits   [] Functional mobility             [x] ADLs           [x] Strength                  [] Cognition   [] Functional transfers           [x] IADLs          [] Safety Awareness  [] Endurance   [x] Fine Motor Coordination    [] Balance      [] Vision/perception    [x] Sensation     [] Gross Motor Coordination [x] ROM           [x] Pain                        [x] Edema          [x] Scar Adhesion/Skin Integrity      OT PLAN OF CARE   OT POC based on physician orders, patient diagnosis and results of clinical assessment     Frequency/Duration: 2x/ week x 8 weeks     Specific OT Treatment to include:   [x] Instruction in HEP                   Modalities:  [x] Therapeutic Exercise

## 2024-01-31 ENCOUNTER — TREATMENT (OUTPATIENT)
Dept: OCCUPATIONAL THERAPY | Age: 89
End: 2024-01-31
Payer: MEDICARE

## 2024-01-31 DIAGNOSIS — S62.618A: Primary | ICD-10-CM

## 2024-01-31 DIAGNOSIS — S62.618A: ICD-10-CM

## 2024-01-31 PROCEDURE — 97110 THERAPEUTIC EXERCISES: CPT | Performed by: OCCUPATIONAL THERAPIST

## 2024-01-31 PROCEDURE — 97530 THERAPEUTIC ACTIVITIES: CPT | Performed by: OCCUPATIONAL THERAPIST

## 2024-01-31 PROCEDURE — 97018 PARAFFIN BATH THERAPY: CPT | Performed by: OCCUPATIONAL THERAPIST

## 2024-01-31 PROCEDURE — 97140 MANUAL THERAPY 1/> REGIONS: CPT | Performed by: OCCUPATIONAL THERAPIST

## 2024-01-31 NOTE — PROGRESS NOTES
OCCUPATIONAL THERAPY DAILY NOTE  [facility] PHYSICIANS [place] SPECIALTY [facility] OCCUPATIONAL THERAPY   [address] [place] [address]  Dept: [phone]  Loc: [phone]   [facility] OT Fax: [phone]      Date:  2024  Initial Evaluation Date: 1-3-24     Evaluating Therapist: Ashley Estevez OT    Patient Name:  Sánchez Gallegos    :  1935    Restrictions/Precautions:  Gentle ROM only, NWB x 4 weeks,  Low fall risk  Diagnosis:    S62.618A Displaced fracture of the proximal phalanx of other finger , initial encounter for closed fracture                                                             Date of Surgery/Injury: LMF/ LRF proximal phalanx fractures (DOI 12/15/23)     Insurance/Certification information:  [employer] Medicare  Plan of care signed (Y/N): Y (thru 4-3-24)  Visit# / total visits: Eval+7 / 16 visits ( 10 approved visits thru 4-3-24)     Referring Practitioner:  Dr Donald House  Specific Practitioner Orders: OT evaluate and treat: PROM, AAROM, AROM, strengthening,  and modalities as needed.  Pt with fractures of the proximal phalanx of the LM/ LR fingers. Pt is OK for buddy taping for gentle AROM and stretching. Continue NWB for the first 4 weeks post injury.     Assessment of current deficits   [] Functional mobility             [x] ADLs           [x] Strength                  [] Cognition   [] Functional transfers           [x] IADLs          [] Safety Awareness  [] Endurance   [x] Fine Motor Coordination    [] Balance      [] Vision/perception    [x] Sensation     [] Gross Motor Coordination [x] ROM           [x] Pain                        [x] Edema          [x] Scar Adhesion/Skin Integrity      OT PLAN OF CARE   OT POC based on physician orders, patient diagnosis and results of clinical assessment     Frequency/Duration: 2x/ week x 8 weeks     Specific OT Treatment to include:   [x] Instruction in HEP                   Modalities:  [x] Therapeutic Exercise

## 2024-02-02 ENCOUNTER — TREATMENT (OUTPATIENT)
Dept: OCCUPATIONAL THERAPY | Age: 89
End: 2024-02-02

## 2024-02-02 DIAGNOSIS — S62.618A: Primary | ICD-10-CM

## 2024-02-02 DIAGNOSIS — S62.618A: ICD-10-CM

## 2024-02-02 NOTE — PROGRESS NOTES
[x] Ultrasound               [] Electrical Stimulation/Attended  [x] PROM/Stretching                    [x] Fluidotherapy          []  Paraffin                   [x] AAROM  [x] AROM                 [] Iontophoresis:   [x] Tendon Glides                                               [] Neuromuscular Re-Ed            [] ADL/IADL re-training    [x] Therapeutic Activity                  [x] Pain Management with/without modalities PRN                 [x] Manual Therapy                      [] Splinting                                   [x] Scar Management                   []Joint Protection/Training  []Ergonomics                             [] Joint Mobilization                      [] Adaptive Equipment Assessment/Training                             [x] Manual Edema Mobilization   [] Myofascial Release                 [] Energy Conservation/Work Simplification  [x] FM Coordination           [] Safety retraining/education per  individual diagnosis/goals  [] Desensitization        Patient Specific Goal: Use hand again                             GOALS (Long term same as Short term): updated 2-2-24  1) Patient will demonstrate good understanding of home program (exercises/activities/diagnosis/prognosis/goals) with good accuracy.( Ongoing for edema control,  ROM, stretches and light strengthening)   2) Patient will demonstrate increased active/passive range of motion of their left hand by atleast DPC <1 for ADL/IADL completion including all self care and using the hand for driving. (Progress noted- DPC has improved to -1 cm to -2.5 cm at each digit)  3) Patient will demonstrate  /pinch strength of at least 30 / 5 pinch pounds of their left hand for ease of opening tight containers and unilateral lifting of items during the day. (Goal met for prehension strength but remains below goal level for  at 20#. Tolerance for lifting and opening tight containers has improved.)  4) Patient to report decreased

## 2024-02-07 ENCOUNTER — TREATMENT (OUTPATIENT)
Dept: OCCUPATIONAL THERAPY | Age: 89
End: 2024-02-07

## 2024-02-07 DIAGNOSIS — S62.618A: Primary | ICD-10-CM

## 2024-02-07 DIAGNOSIS — S62.618A: ICD-10-CM

## 2024-02-07 NOTE — PROGRESS NOTES
OCCUPATIONAL THERAPY DAILY NOTE  [facility] PHYSICIANS [place] SPECIALTY [facility] OCCUPATIONAL THERAPY   [address] [place] [address]  Dept: [phone]  Loc: [phone]   [facility] OT Fax: [phone]      Date:  2024  Initial Evaluation Date: 1-3-24     Evaluating Therapist: Ashley Estevez OT    Patient Name:  Sánchez Gallegos    :  1935    Restrictions/Precautions:  Activity as tolerated,  Low fall risk  Diagnosis:    S62.618A Displaced fracture of the proximal phalanx of other finger , initial encounter for closed fracture                                                             Date of Surgery/Injury: LMF/ LRF proximal phalanx fractures (DOI 12/15/23)     Insurance/Certification information:  [employer]  Plan of care signed (Y/N): Y (thru 4-3-24)  Visit# / total visits: Eval+  /  visits ( 10 approved visits thru 4-3-24)     Referring Practitioner:  Dr Donald House  Specific Practitioner Orders: OT evaluate and treat: PROM, AAROM, AROM, strengthening,  and modalities as needed.  Pt with fractures of the proximal phalanx of the LM/ LR fingers. Pt is OK for buddy taping for gentle AROM and stretching. Continue NWB for the first 4 weeks post injury.     Assessment of current deficits   [] Functional mobility             [x] ADLs           [x] Strength                  [] Cognition   [] Functional transfers           [x] IADLs          [] Safety Awareness  [] Endurance   [x] Fine Motor Coordination    [] Balance      [] Vision/perception    [x] Sensation     [] Gross Motor Coordination [x] ROM           [x] Pain                        [x] Edema          [x] Scar Adhesion/Skin Integrity      OT PLAN OF CARE   OT POC based on physician orders, patient diagnosis and results of clinical assessment     Frequency/Duration: 2x/ week x 8 weeks     Specific OT Treatment to include:   [x] Instruction in HEP                   Modalities:  [x] Therapeutic Exercise                 [x]

## 2024-02-09 ENCOUNTER — TREATMENT (OUTPATIENT)
Dept: OCCUPATIONAL THERAPY | Age: 89
End: 2024-02-09

## 2024-02-09 DIAGNOSIS — S62.618A: ICD-10-CM

## 2024-02-09 DIAGNOSIS — S62.618A: Primary | ICD-10-CM

## 2024-02-09 NOTE — PROGRESS NOTES
OCCUPATIONAL THERAPY DAILY NOTE/ UPDATE  [facility] PHYSICIANS [place] SPECIALTY CARE [facility] OCCUPATIONAL THERAPY   [address]  [place] [address]  Dept: [phone]  Loc: [phone]   [facility] OT Fax: [phone]      Date:  2024  Initial Evaluation Date: 1-3-24     Evaluating Therapist: Ashley Estevez OT    Patient Name:  Sánchez Gallegos    :  1935    Restrictions/Precautions:  Activity as tolerated,  Low fall risk  Diagnosis:    S62.618A Displaced fracture of the proximal phalanx of other finger , initial encounter for closed fracture                                                             Date of Surgery/Injury: LMF/ LRF proximal phalanx fractures (DOI 12/15/23)     Insurance/Certification information:  [employer] Medicare  Plan of care signed (Y/N): Y (thru 4-3-24)  Visit# / total visits: Eval+ 10 / 16 visits ( 10 approved visits thru 4-3-24)     Referring Practitioner:  Dr Donald House  Specific Practitioner Orders: OT evaluate and treat: PROM, AAROM, AROM, strengthening,  and modalities as needed.  Pt with fractures of the proximal phalanx of the LM/ LR fingers. Pt is OK for buddy taping for gentle AROM and stretching. Continue NWB for the first 4 weeks post injury.     Assessment of current deficits   [] Functional mobility             [x] ADLs           [x] Strength                  [] Cognition   [] Functional transfers           [x] IADLs          [] Safety Awareness  [] Endurance   [x] Fine Motor Coordination    [] Balance      [] Vision/perception    [x] Sensation     [] Gross Motor Coordination [x] ROM           [x] Pain                        [x] Edema          [x] Scar Adhesion/Skin Integrity      OT PLAN OF CARE   OT POC based on physician orders, patient diagnosis and results of clinical assessment     Frequency/Duration: 2x/ week x 8 weeks     Specific OT Treatment to include:   [x] Instruction in HEP                   Modalities:  [x] Therapeutic Exercise

## 2024-02-16 ENCOUNTER — TREATMENT (OUTPATIENT)
Dept: OCCUPATIONAL THERAPY | Age: 89
End: 2024-02-16

## 2024-02-16 DIAGNOSIS — S62.618A: Primary | ICD-10-CM

## 2024-02-16 DIAGNOSIS — S62.618A: ICD-10-CM

## 2024-02-16 NOTE — PROGRESS NOTES
OCCUPATIONAL THERAPY DAILY NOTE/ UPDATE  [facility] PHYSICIANS [place] SPECIALTY CARE [facility] OCCUPATIONAL THERAPY   [address]  [place] [address]  Dept: [phone]  Loc: [phone]   [facility] OT Fax: [phone]      Date:  2024  Initial Evaluation Date: 1-3-24     Evaluating Therapist: Ashley Estevez OT    Patient Name:  Sánchez Gallegos    :  1935    Restrictions/Precautions:  Activity as tolerated,  Low fall risk  Diagnosis:    S62.618A Displaced fracture of the proximal phalanx of other finger , initial encounter for closed fracture                                                             Date of Surgery/Injury: LMF/ LRF proximal phalanx fractures (DOI 12/15/23)     Insurance/Certification information:  [employer] Medicare/ [employer] #[id number]  Plan of care signed (Y/N): Y (thru 4-3-24)  Visit# / total visits: Eval+ 11 / 12 visits (2 addl approved visits thru  ( 10 prior visits)     Referring Practitioner:  Dr Donald House  Specific Practitioner Orders: OT evaluate and treat: PROM, AAROM, AROM, strengthening,  and modalities as needed.  Pt with fractures of the proximal phalanx of the LM/ LR fingers. Pt is OK for buddy taping for gentle AROM and stretching. Continue NWB for the first 4 weeks post injury.     Assessment of current deficits   [] Functional mobility             [x] ADLs           [x] Strength                  [] Cognition   [] Functional transfers           [x] IADLs          [] Safety Awareness  [] Endurance   [x] Fine Motor Coordination    [] Balance      [] Vision/perception    [x] Sensation     [] Gross Motor Coordination [x] ROM           [x] Pain                        [x] Edema          [x] Scar Adhesion/Skin Integrity      OT PLAN OF CARE   OT POC based on physician orders, patient diagnosis and results of clinical assessment     Frequency/Duration: 2x/ week x 8 weeks     Specific OT Treatment to include:   [x] Instruction in HEP

## 2024-02-23 ENCOUNTER — TREATMENT (OUTPATIENT)
Dept: OCCUPATIONAL THERAPY | Age: 89
End: 2024-02-23

## 2024-02-23 DIAGNOSIS — S62.618A: Primary | ICD-10-CM

## 2024-02-23 DIAGNOSIS — S62.618A: ICD-10-CM

## 2024-02-23 NOTE — PROGRESS NOTES
report decreased pain in their affected left hand from 1-6/10 light hand movement to 2/10 or less with full daily functional use. (Goal met- pain has resolved. The main issue is arthritic joint stiffness.)  5) Increase in fine motor function as evidenced by decreased time to complete 9-hole peg test and/or MRMT test by at least 5-10 seconds with  left hand  in order to complete fasteners more easily. (Goal met- None hole is completed in 32 sec from 43 sec and ability to perform fasteners is very good.)   6) Patient to demonstrate decreased guarding of the left hand fractured fingers  from 100% to 20% or less. (Goal met- guarding has resolved)  7) Patient will decrease QuickDASH score to 25% or less for increased participation in daily functional activities. (Goal met- disability rating has improved to 10% from 50%)       TODAY'S TREATMENT     Pain Level: No pain reported- just digital tightness  Subjective:  Pt says he has been doing his exercises.   Objective:    Updated POC to be completed by 3-3-24.    INTERVENTION: COMPLETED: SPECIFICS/COMMENTS:   Modality:     Fluidotherapy  - 10 min for left hand with AROM to decrease joint stiffness   Paraffin Tx L hand x - 10 min to decrease joint stiffness   AROM:     Hand AROM X  X      X  x   - isolated MCP flex/ ext- 10x  - Fingertip curls and into composite fist as tolerated 10x  - in hand translation with coins- heads up  - grasp release beans  -Active fisting with MCPs in flexion 10x   -Exercise spheres CW and CCW- 20 reps each.                PROM/Stretching:     PROM hand x - MCP, PIP and DIPs of each digit as tolerated      X    X  continue - ace bandage stretch hand into flexion with MH  - extrinsic and intrinsic hand stretches using the table- 5x each with 10 sec hold  - Flexion glove provided for home composite finger flexion stretch/ start wear at 10 min 2-3x/ day    Scar Mass/Edema Control:     Soft tissue mob X  x - Retrograde massage  - Medium compression

## 2024-03-04 ENCOUNTER — APPOINTMENT (OUTPATIENT)
Dept: GENERAL RADIOLOGY | Age: 89
End: 2024-03-04
Payer: MEDICARE

## 2024-03-04 ENCOUNTER — HOSPITAL ENCOUNTER (EMERGENCY)
Age: 89
Discharge: HOME | End: 2024-03-04
Payer: MEDICARE

## 2024-03-04 VITALS
BODY MASS INDEX: 27.37 KG/M2 | TEMPERATURE: 98.1 F | OXYGEN SATURATION: 97 % | HEART RATE: 65 BPM | WEIGHT: 180 LBS | DIASTOLIC BLOOD PRESSURE: 65 MMHG | RESPIRATION RATE: 18 BRPM | SYSTOLIC BLOOD PRESSURE: 130 MMHG

## 2024-03-04 DIAGNOSIS — R58 ECCHYMOSIS: ICD-10-CM

## 2024-03-04 DIAGNOSIS — M79.89 LEG SWELLING: Primary | ICD-10-CM

## 2024-03-04 PROCEDURE — 73502 X-RAY EXAM HIP UNI 2-3 VIEWS: CPT

## 2024-03-04 PROCEDURE — 99211 OFF/OP EST MAY X REQ PHY/QHP: CPT

## 2024-03-04 PROCEDURE — 73560 X-RAY EXAM OF KNEE 1 OR 2: CPT

## 2024-03-04 ASSESSMENT — PAIN DESCRIPTION - ORIENTATION: ORIENTATION: LEFT

## 2024-03-04 ASSESSMENT — PAIN SCALES - GENERAL: PAINLEVEL_OUTOF10: 10

## 2024-03-04 ASSESSMENT — PAIN DESCRIPTION - DESCRIPTORS: DESCRIPTORS: ACHING;DISCOMFORT;TENDER

## 2024-03-04 ASSESSMENT — PAIN - FUNCTIONAL ASSESSMENT: PAIN_FUNCTIONAL_ASSESSMENT: 0-10

## 2024-03-04 ASSESSMENT — PAIN DESCRIPTION - LOCATION: LOCATION: LEG

## 2024-03-04 NOTE — ED PROVIDER NOTES
Department of Emergency Medicine  [facility]  Provider Note  Admit Date/Time: 3/4/2024  1:03 PM  Room:   NAME: Sánchez Gallegos  : 1935  MRN: [id number]     Chief Complaint:  Leg Swelling (Left leg discolored and swollen-painful )    History of Present Illness        Sánhcez Gallegos is a 88 y.o. male who has a past medical history of:   Past Medical History:   Diagnosis Date    Acid reflux     Cancer (HCC)     skin    Hypertension     Prostate enlargement     presents to the urgent care center by private car for evaluation.  He is having pain in his left knee and his left groin and thigh area.  He is also having swelling in that entire left leg along with ecchymosis especially in the thigh.    He is not complaining of any abdominal pain.  He said he is not having shortness of breath anymore than usual he does not have any chest pain.  This started over the weekend so it has been going on for 2 or 3 days prior to this starting on Friday he did do a lot of heavy work he was taking curtains down and moving things around because he is getting new windows in his house.  ROS    Pertinent positives and negatives are stated within HPI, all other systems reviewed and are negative.    Past Surgical History:   Procedure Laterality Date    ABDOMEN SURGERY      adhesion    APPENDECTOMY      CATARACT REMOVAL WITH IMPLANT Left 04/07/15    CATARACT REMOVAL WITH IMPLANT Right 2017    JOINT REPLACEMENT      bilateral knees    KNEE ARTHROSCOPY      right    SKIN BIOPSY      TONSILLECTOMY     Social History:  reports that he has never smoked. He has never used smokeless tobacco. He reports current alcohol use.  Family History: family history is not on file.  Allergies: Sulfa antibiotics    Physical Exam   Oxygen Saturation Interpretation: Normal.   ED Triage Vitals   BP Temp Temp src Pulse Respirations SpO2 Height Weight - Scale   24 1303 24 1303 -- 24 1303 24 1303

## 2024-03-04 NOTE — ED NOTES
[place] [facility] [place] notified.      Amparo Ascencio LPN  03/04/24 1926

## 2024-09-23 ENCOUNTER — OFFICE VISIT (OUTPATIENT)
Dept: ORTHOPEDIC SURGERY | Age: 89
End: 2024-09-23
Payer: MEDICARE

## 2024-09-23 VITALS — TEMPERATURE: 98 F

## 2024-09-23 DIAGNOSIS — S80.02XA CONTUSION OF LEFT KNEE, INITIAL ENCOUNTER: ICD-10-CM

## 2024-09-23 DIAGNOSIS — Z96.652 S/P TOTAL KNEE ARTHROPLASTY, LEFT: Primary | ICD-10-CM

## 2024-09-23 PROCEDURE — 1123F ACP DISCUSS/DSCN MKR DOCD: CPT | Performed by: ORTHOPAEDIC SURGERY

## 2024-09-23 PROCEDURE — G8419 CALC BMI OUT NRM PARAM NOF/U: HCPCS | Performed by: ORTHOPAEDIC SURGERY

## 2024-09-23 PROCEDURE — 1036F TOBACCO NON-USER: CPT | Performed by: ORTHOPAEDIC SURGERY

## 2024-09-23 PROCEDURE — G8427 DOCREV CUR MEDS BY ELIG CLIN: HCPCS | Performed by: ORTHOPAEDIC SURGERY

## 2024-09-23 PROCEDURE — 99203 OFFICE O/P NEW LOW 30 MIN: CPT | Performed by: ORTHOPAEDIC SURGERY
